# Patient Record
Sex: MALE | Race: BLACK OR AFRICAN AMERICAN | NOT HISPANIC OR LATINO
[De-identification: names, ages, dates, MRNs, and addresses within clinical notes are randomized per-mention and may not be internally consistent; named-entity substitution may affect disease eponyms.]

---

## 2019-12-23 ENCOUNTER — APPOINTMENT (OUTPATIENT)
Dept: CARDIOLOGY | Facility: CLINIC | Age: 54
End: 2019-12-23

## 2019-12-30 ENCOUNTER — APPOINTMENT (OUTPATIENT)
Dept: CARDIOLOGY | Facility: CLINIC | Age: 54
End: 2019-12-30
Payer: COMMERCIAL

## 2019-12-30 ENCOUNTER — LABORATORY RESULT (OUTPATIENT)
Age: 54
End: 2019-12-30

## 2019-12-30 ENCOUNTER — NON-APPOINTMENT (OUTPATIENT)
Age: 54
End: 2019-12-30

## 2019-12-30 VITALS
BODY MASS INDEX: 27.2 KG/M2 | HEART RATE: 54 BPM | DIASTOLIC BLOOD PRESSURE: 90 MMHG | WEIGHT: 190 LBS | HEIGHT: 70 IN | OXYGEN SATURATION: 99 % | SYSTOLIC BLOOD PRESSURE: 150 MMHG | TEMPERATURE: 98.2 F

## 2019-12-30 VITALS — SYSTOLIC BLOOD PRESSURE: 146 MMHG | DIASTOLIC BLOOD PRESSURE: 82 MMHG

## 2019-12-30 DIAGNOSIS — Z71.89 OTHER SPECIFIED COUNSELING: ICD-10-CM

## 2019-12-30 DIAGNOSIS — Z00.00 ENCOUNTER FOR GENERAL ADULT MEDICAL EXAMINATION W/OUT ABNORMAL FINDINGS: ICD-10-CM

## 2019-12-30 DIAGNOSIS — Z12.5 ENCOUNTER FOR SCREENING FOR MALIGNANT NEOPLASM OF PROSTATE: ICD-10-CM

## 2019-12-30 DIAGNOSIS — Z78.9 OTHER SPECIFIED HEALTH STATUS: ICD-10-CM

## 2019-12-30 PROCEDURE — 99386 PREV VISIT NEW AGE 40-64: CPT

## 2019-12-30 PROCEDURE — 93000 ELECTROCARDIOGRAM COMPLETE: CPT

## 2019-12-30 NOTE — PHYSICAL EXAM
[No Acute Distress] : no acute distress [Well Developed] : well developed [Well Nourished] : well nourished [Well-Appearing] : well-appearing [Normal Sclera/Conjunctiva] : normal sclera/conjunctiva [EOMI] : extraocular movements intact [PERRL] : pupils equal round and reactive to light [Normal Outer Ear/Nose] : the outer ears and nose were normal in appearance [No JVD] : no jugular venous distention [Normal Oropharynx] : the oropharynx was normal [No Lymphadenopathy] : no lymphadenopathy [Supple] : supple [Thyroid Normal, No Nodules] : the thyroid was normal and there were no nodules present [No Accessory Muscle Use] : no accessory muscle use [No Respiratory Distress] : no respiratory distress  [Clear to Auscultation] : lungs were clear to auscultation bilaterally [Normal Rate] : normal rate  [Regular Rhythm] : with a regular rhythm [Normal S1, S2] : normal S1 and S2 [No Murmur] : no murmur heard [No Carotid Bruits] : no carotid bruits [No Abdominal Bruit] : a ~M bruit was not heard ~T in the abdomen [No Varicosities] : no varicosities [Pedal Pulses Present] : the pedal pulses are present [No Edema] : there was no peripheral edema [No Palpable Aorta] : no palpable aorta [No Extremity Clubbing/Cyanosis] : no extremity clubbing/cyanosis [Non Tender] : non-tender [Soft] : abdomen soft [No Masses] : no abdominal mass palpated [Non-distended] : non-distended [No HSM] : no HSM [Normal Bowel Sounds] : normal bowel sounds [Normal Anterior Cervical Nodes] : no anterior cervical lymphadenopathy [Normal Posterior Cervical Nodes] : no posterior cervical lymphadenopathy [No CVA Tenderness] : no CVA  tenderness [No Joint Swelling] : no joint swelling [No Spinal Tenderness] : no spinal tenderness [Grossly Normal Strength/Tone] : grossly normal strength/tone [No Rash] : no rash [Coordination Grossly Intact] : coordination grossly intact [No Focal Deficits] : no focal deficits [Normal Gait] : normal gait [Deep Tendon Reflexes (DTR)] : deep tendon reflexes were 2+ and symmetric [Normal Affect] : the affect was normal [Normal Insight/Judgement] : insight and judgment were intact [FreeTextEntry1] : prostatte enlarged  [de-identified] : Prostate enlarged /normal genitals

## 2019-12-30 NOTE — HISTORY OF PRESENT ILLNESS
[FreeTextEntry1] : Annual Wellness Exam and to Establish care  [de-identified] : This is a 54 year old gentlemen with no significant PMH presents today for his annual wellness exam and to establish care at this time. Patient states that he his feeling good and has no complaints at this time. Patient denies dyspnea, palpitations, chest pain, nausea, vomiting, dizziness and lightheadedness.\par

## 2020-01-09 ENCOUNTER — MOBILE ON CALL (OUTPATIENT)
Age: 55
End: 2020-01-09

## 2020-01-12 LAB
25(OH)D3 SERPL-MCNC: 28 NG/ML
ALBUMIN SERPL ELPH-MCNC: 4.5 G/DL
ALP BLD-CCNC: 78 U/L
ALT SERPL-CCNC: 57 U/L
ANION GAP SERPL CALC-SCNC: 13 MMOL/L
APPEARANCE: CLEAR
AST SERPL-CCNC: 31 U/L
BACTERIA: NEGATIVE
BASOPHILS # BLD AUTO: 0.03 K/UL
BASOPHILS NFR BLD AUTO: 0.4 %
BILIRUB SERPL-MCNC: 0.2 MG/DL
BILIRUBIN URINE: NEGATIVE
BLOOD URINE: NEGATIVE
BUN SERPL-MCNC: 13 MG/DL
CALCIUM SERPL-MCNC: 10.1 MG/DL
CHLORIDE SERPL-SCNC: 101 MMOL/L
CHOLEST SERPL-MCNC: 188 MG/DL
CHOLEST/HDLC SERPL: 3.4 RATIO
CO2 SERPL-SCNC: 26 MMOL/L
COLOR: YELLOW
CREAT SERPL-MCNC: 1.2 MG/DL
EOSINOPHIL # BLD AUTO: 0.39 K/UL
EOSINOPHIL NFR BLD AUTO: 4.8 %
ESTIMATED AVERAGE GLUCOSE: 131 MG/DL
GLUCOSE QUALITATIVE U: NEGATIVE
GLUCOSE SERPL-MCNC: 93 MG/DL
HBA1C MFR BLD HPLC: 6.2 %
HCT VFR BLD CALC: 44.3 %
HDLC SERPL-MCNC: 56 MG/DL
HGB BLD-MCNC: 13.6 G/DL
HYALINE CASTS: 0 /LPF
IMM GRANULOCYTES NFR BLD AUTO: 0.4 %
KETONES URINE: NEGATIVE
LDLC SERPL CALC-MCNC: 100 MG/DL
LEUKOCYTE ESTERASE URINE: NEGATIVE
LYMPHOCYTES # BLD AUTO: 2.93 K/UL
LYMPHOCYTES NFR BLD AUTO: 35.9 %
MAGNESIUM SERPL-MCNC: 2.1 MG/DL
MAN DIFF?: NORMAL
MCHC RBC-ENTMCNC: 28.3 PG
MCHC RBC-ENTMCNC: 30.7 GM/DL
MCV RBC AUTO: 92.3 FL
MICROSCOPIC-UA: NORMAL
MONOCYTES # BLD AUTO: 0.54 K/UL
MONOCYTES NFR BLD AUTO: 6.6 %
NEUTROPHILS # BLD AUTO: 4.24 K/UL
NEUTROPHILS NFR BLD AUTO: 51.9 %
NITRITE URINE: NEGATIVE
PH URINE: 7.5
PHOSPHATE SERPL-MCNC: 3.9 MG/DL
PLATELET # BLD AUTO: 230 K/UL
POTASSIUM SERPL-SCNC: 4.8 MMOL/L
PROT SERPL-MCNC: 7 G/DL
PROTEIN URINE: NEGATIVE
PSA SERPL-MCNC: 5.11 NG/ML
RBC # BLD: 4.8 M/UL
RBC # FLD: 14.8 %
RED BLOOD CELLS URINE: 2 /HPF
SODIUM SERPL-SCNC: 140 MMOL/L
SPECIFIC GRAVITY URINE: 1.02
SQUAMOUS EPITHELIAL CELLS: 0 /HPF
T3RU NFR SERPL: 1 TBI
T4 FREE SERPL-MCNC: 1 NG/DL
T4 SERPL-MCNC: 6 UG/DL
TESTOST BND SERPL-MCNC: 10.7 PG/ML
TESTOST SERPL-MCNC: 426.7 NG/DL
TRIGL SERPL-MCNC: 162 MG/DL
TSH SERPL-ACNC: 1.84 UIU/ML
URATE SERPL-MCNC: 6.1 MG/DL
UROBILINOGEN URINE: NORMAL
WBC # FLD AUTO: 8.16 K/UL
WHITE BLOOD CELLS URINE: 0 /HPF

## 2020-01-13 ENCOUNTER — TRANSCRIPTION ENCOUNTER (OUTPATIENT)
Age: 55
End: 2020-01-13

## 2020-01-22 ENCOUNTER — APPOINTMENT (OUTPATIENT)
Dept: UROLOGY | Facility: CLINIC | Age: 55
End: 2020-01-22
Payer: COMMERCIAL

## 2020-01-22 VITALS
HEART RATE: 66 BPM | SYSTOLIC BLOOD PRESSURE: 135 MMHG | WEIGHT: 185 LBS | TEMPERATURE: 98.4 F | RESPIRATION RATE: 13 BRPM | OXYGEN SATURATION: 98 % | HEIGHT: 70 IN | DIASTOLIC BLOOD PRESSURE: 83 MMHG | BODY MASS INDEX: 26.48 KG/M2

## 2020-01-22 PROCEDURE — 99203 OFFICE O/P NEW LOW 30 MIN: CPT

## 2020-01-22 RX ORDER — BLOOD SUGAR DIAGNOSTIC
100 STRIP MISCELLANEOUS
Refills: 0 | Status: ACTIVE | COMMUNITY

## 2020-01-22 NOTE — PHYSICAL EXAM
[Normal Appearance] : normal appearance [General Appearance - Well Nourished] : well nourished [General Appearance - Well Developed] : well developed [General Appearance - In No Acute Distress] : no acute distress [Well Groomed] : well groomed [Edema] : no peripheral edema [Respiration, Rhythm And Depth] : normal respiratory rhythm and effort [Abdomen Soft] : soft [Exaggerated Use Of Accessory Muscles For Inspiration] : no accessory muscle use [Costovertebral Angle Tenderness] : no ~M costovertebral angle tenderness [Abdomen Tenderness] : non-tender [Urethral Meatus] : meatus normal [Penis Abnormality] : normal circumcised penis [Urinary Bladder Findings] : the bladder was normal on palpation [Testes Mass (___cm)] : there were no testicular masses [Prostate Tenderness] : the prostate was not tender [Testes Tenderness] : no tenderness of the testes [Scrotum] : the scrotum was normal [Prostate Enlarged] : was enlarged [No Prostate Nodules] : no prostate nodules [No Focal Deficits] : no focal deficits [Normal Station and Gait] : the gait and station were normal for the patient's age [] : no rash [Affect] : the affect was normal [Oriented To Time, Place, And Person] : oriented to person, place, and time [Mood] : the mood was normal [Inguinal Lymph Nodes Enlarged Bilaterally] : inguinal [Not Anxious] : not anxious

## 2020-01-22 NOTE — ASSESSMENT
[FreeTextEntry1] : He does not have significant urinary symptoms. He will stop taking over-the-counter supplements on testosterone booster. We had a long discussion regarding his PSA level. We discussed different PSA parameters such as PSA velocity, free PSA and age-adjusted PSA level. Other markers such as 4K score, PCA3, Prostate health Index, etc were reviewed.  We also discussed observation with repeat PSA level, prostate or pelvic MRI and prostate biopsy in detail. Risks and benefits of each option were discussed and questions were answered. Patient was made aware that prostate cancer can exist at any PSA level.\par Prostate biopsy was reviewed in detail. We discussed how the procedure is performed. Preparation with oral antibiotics and Fleet enema was reviewed. Risks of biopsy especially bacteremia, UTI, sepsis, bleeding, erectile dysfunction, etc were discussed. He was made aware that biopsy may not be able to diagnose prostate cancer. Questions were answered. At first, he will repeat PSA level in a few weeks and then we will decide on the next step.\par \par Mundo Shah MD, FACS\par [facility]\par  of Urology\par \par [address], [address]\[place] [address]\par \par [address], [address]\[place] [address]\par \par Tel: [phone]\par Fax: [phone]

## 2020-01-22 NOTE — HISTORY OF PRESENT ILLNESS
[FreeTextEntry1] : He is a 54-year-old man who is seen today for initial visit. He was told a few years ago to have elevated PSA level and after he stopped over-the-counter testosterone supplementation, PSA level decreased. He does not recall his levels. He does not have any urinary symptoms. There is no family history of prostate cancer or other neurological issues. PSA level was 5.1 in December 2019, hemoglobin A1c 6.2, urinalysis normal, testosterone 427. There is no weight loss or bone pain. He is on multiple supplementations including testosterone booster and saw palmetto.

## 2020-02-05 ENCOUNTER — APPOINTMENT (OUTPATIENT)
Dept: CARDIOLOGY | Facility: CLINIC | Age: 55
End: 2020-02-05

## 2020-02-06 ENCOUNTER — APPOINTMENT (OUTPATIENT)
Dept: CARDIOLOGY | Facility: CLINIC | Age: 55
End: 2020-02-06
Payer: COMMERCIAL

## 2020-02-06 VITALS
OXYGEN SATURATION: 98 % | HEIGHT: 70 IN | WEIGHT: 185.5 LBS | SYSTOLIC BLOOD PRESSURE: 120 MMHG | TEMPERATURE: 98.4 F | HEART RATE: 70 BPM | BODY MASS INDEX: 26.56 KG/M2 | DIASTOLIC BLOOD PRESSURE: 80 MMHG

## 2020-02-06 PROCEDURE — 99213 OFFICE O/P EST LOW 20 MIN: CPT

## 2020-02-06 RX ORDER — CHROMIUM 200 MCG
25 MCG TABLET ORAL
Refills: 0 | Status: ACTIVE | COMMUNITY
Start: 2020-02-06

## 2020-02-06 NOTE — HISTORY OF PRESENT ILLNESS
[FreeTextEntry1] : Akhil is a 53yo male who presents for routine follow-up. He  was last seen in office in December and was found to have elevated BP. He reports he did not make any lifestyle changes since his previous visit. patient was also advised to go to urology for an enlarged prostate which he did. Pt saw Dr. Shah who recommended that pt return to office in a few weeks to repeat PSA and possible biopsy if still elevated.

## 2020-02-06 NOTE — PHYSICAL EXAM
[General Appearance - Well Developed] : well developed [Well Groomed] : well groomed [Normal Appearance] : normal appearance [No Deformities] : no deformities [General Appearance - In No Acute Distress] : no acute distress [General Appearance - Well Nourished] : well nourished [Normal Conjunctiva] : the conjunctiva exhibited no abnormalities [Eyelids - No Xanthelasma] : the eyelids demonstrated no xanthelasmas [No Oral Cyanosis] : no oral cyanosis [No Oral Pallor] : no oral pallor [Normal Oral Mucosa] : normal oral mucosa [Normal Jugular Venous V Waves Present] : normal jugular venous V waves present [Normal Jugular Venous A Waves Present] : normal jugular venous A waves present [No Jugular Venous Hansen A Waves] : no jugular venous hansen A waves [Respiration, Rhythm And Depth] : normal respiratory rhythm and effort [Heart Rate And Rhythm] : heart rate and rhythm were normal [Auscultation Breath Sounds / Voice Sounds] : lungs were clear to auscultation bilaterally [Exaggerated Use Of Accessory Muscles For Inspiration] : no accessory muscle use [Abdomen Soft] : soft [Heart Sounds] : normal S1 and S2 [Murmurs] : no murmurs present [Abdomen Tenderness] : non-tender [Abdomen Mass (___ Cm)] : no abdominal mass palpated [Gait - Sufficient For Exercise Testing] : the gait was sufficient for exercise testing [Nail Clubbing] : no clubbing of the fingernails [Abnormal Walk] : normal gait [Petechial Hemorrhages (___cm)] : no petechial hemorrhages [Cyanosis, Localized] : no localized cyanosis [Skin Color & Pigmentation] : normal skin color and pigmentation [] : no rash [Skin Lesions] : no skin lesions [No Venous Stasis] : no venous stasis [No Skin Ulcers] : no skin ulcer [No Xanthoma] : no  xanthoma was observed [Oriented To Time, Place, And Person] : oriented to person, place, and time [No Anxiety] : not feeling anxious [Affect] : the affect was normal [Mood] : the mood was normal

## 2020-05-16 ENCOUNTER — TRANSCRIPTION ENCOUNTER (OUTPATIENT)
Age: 55
End: 2020-05-16

## 2020-05-27 ENCOUNTER — APPOINTMENT (OUTPATIENT)
Dept: UROLOGY | Facility: CLINIC | Age: 55
End: 2020-05-27
Payer: COMMERCIAL

## 2020-05-27 VITALS
BODY MASS INDEX: 26.48 KG/M2 | OXYGEN SATURATION: 98 % | RESPIRATION RATE: 13 BRPM | TEMPERATURE: 98.5 F | WEIGHT: 185 LBS | HEIGHT: 70 IN | DIASTOLIC BLOOD PRESSURE: 88 MMHG | SYSTOLIC BLOOD PRESSURE: 134 MMHG | HEART RATE: 64 BPM

## 2020-05-27 DIAGNOSIS — R73.03 PREDIABETES.: ICD-10-CM

## 2020-05-27 LAB — PSA SERPL-MCNC: 7.58 NG/ML

## 2020-05-27 PROCEDURE — 99213 OFFICE O/P EST LOW 20 MIN: CPT

## 2020-05-27 NOTE — PHYSICAL EXAM
[General Appearance - Well Developed] : well developed [General Appearance - Well Nourished] : well nourished [Normal Appearance] : normal appearance [Well Groomed] : well groomed [General Appearance - In No Acute Distress] : no acute distress [Abdomen Tenderness] : non-tender [Abdomen Soft] : soft [Costovertebral Angle Tenderness] : no ~M costovertebral angle tenderness [Urethral Meatus] : meatus normal [Penis Abnormality] : normal circumcised penis [Urinary Bladder Findings] : the bladder was normal on palpation [Scrotum] : the scrotum was normal [Testes Tenderness] : no tenderness of the testes [Testes Mass (___cm)] : there were no testicular masses [Prostate Tenderness] : the prostate was not tender [No Prostate Nodules] : no prostate nodules [Prostate Enlarged] : was enlarged [FreeTextEntry1] : JALIL done Jan 2020, Vasa palpable.  [] : no respiratory distress [Respiration, Rhythm And Depth] : normal respiratory rhythm and effort [Exaggerated Use Of Accessory Muscles For Inspiration] : no accessory muscle use [Oriented To Time, Place, And Person] : oriented to person, place, and time [Affect] : the affect was normal [Not Anxious] : not anxious [Mood] : the mood was normal

## 2020-05-27 NOTE — ASSESSMENT
[FreeTextEntry1] : We discussed having a prostate biopsy again and its risks in detail. Preparation for biopsy was discussed. Pending insurance approval, we can attempt to have a prostate MRI done first to assess for any prostate lesions. Depending on the MRI results, we will determine what type of biopsy he should have.\par He is interested in proceeding with vasectomy. Different surgical techniques and their outcomes were discussed. Risks of the procedure discussed included but were not limited to bleeding, infection, testicular injury or atrophy, failure of the procedure and need for redo procedure, etc. Post-vasectomy pain syndrome was discussed. Also, studies discussing the link between vasectomy and prostate cancer were discussed. Use of local vs. general anesthesia was discussed. He may consider vasectomy and biopsy simultaneously or at separate times.\par \par Mundo Shah MD, FACS\par [facility]\par  of Urology\par \par [address], [address]\[place] [address]\par \par [address], [address]\[place] [address]\par \par Tel: [phone]\par Fax: [phone]

## 2020-05-27 NOTE — HISTORY OF PRESENT ILLNESS
[FreeTextEntry1] : He is a 54-year-old man who is seen today for elevated PSA level. He has no new symptoms. He waited several months and repeated PSA level after stopping any over-the-counter supplements. His level actually increased to 7.58 in May 2020. He has 3 children. PSA level was 5.1 in December 2019, hemoglobin A1c 6.2, urinalysis normal, testosterone 427. He is also interested in a vasectomy.\par Previous notes: He was told a few years ago to have elevated PSA level and after he stopped over-the-counter testosterone supplementation, PSA level decreased. He does not have any urinary symptoms. There is no family history of prostate cancer or other urological issues. There is no weight loss or bone pain. He is on multiple supplementations including testosterone booster and saw palmetto.

## 2020-06-08 ENCOUNTER — APPOINTMENT (OUTPATIENT)
Dept: CARDIOLOGY | Facility: CLINIC | Age: 55
End: 2020-06-08

## 2020-06-10 ENCOUNTER — OUTPATIENT (OUTPATIENT)
Dept: OUTPATIENT SERVICES | Facility: HOSPITAL | Age: 55
LOS: 1 days | End: 2020-06-10
Payer: COMMERCIAL

## 2020-06-10 ENCOUNTER — APPOINTMENT (OUTPATIENT)
Dept: MRI IMAGING | Facility: CLINIC | Age: 55
End: 2020-06-10
Payer: COMMERCIAL

## 2020-06-10 ENCOUNTER — RESULT REVIEW (OUTPATIENT)
Age: 55
End: 2020-06-10

## 2020-06-10 DIAGNOSIS — Z00.8 ENCOUNTER FOR OTHER GENERAL EXAMINATION: ICD-10-CM

## 2020-06-10 DIAGNOSIS — R97.20 ELEVATED PROSTATE SPECIFIC ANTIGEN [PSA]: ICD-10-CM

## 2020-06-10 PROCEDURE — 76377 3D RENDER W/INTRP POSTPROCES: CPT | Mod: 26

## 2020-06-10 PROCEDURE — 76377 3D RENDER W/INTRP POSTPROCES: CPT

## 2020-06-10 PROCEDURE — A9585: CPT

## 2020-06-10 PROCEDURE — 72197 MRI PELVIS W/O & W/DYE: CPT | Mod: 26

## 2020-06-10 PROCEDURE — 72197 MRI PELVIS W/O & W/DYE: CPT

## 2020-07-15 ENCOUNTER — LABORATORY RESULT (OUTPATIENT)
Age: 55
End: 2020-07-15

## 2020-07-16 ENCOUNTER — APPOINTMENT (OUTPATIENT)
Dept: UROLOGY | Facility: CLINIC | Age: 55
End: 2020-07-16
Payer: COMMERCIAL

## 2020-07-16 VITALS — TEMPERATURE: 98.2 F

## 2020-07-16 PROCEDURE — 55250 REMOVAL OF SPERM DUCT(S): CPT

## 2020-07-21 ENCOUNTER — APPOINTMENT (OUTPATIENT)
Dept: CARDIOLOGY | Facility: CLINIC | Age: 55
End: 2020-07-21
Payer: COMMERCIAL

## 2020-07-21 ENCOUNTER — NON-APPOINTMENT (OUTPATIENT)
Age: 55
End: 2020-07-21

## 2020-07-21 VITALS
BODY MASS INDEX: 25.05 KG/M2 | HEIGHT: 70 IN | OXYGEN SATURATION: 99 % | TEMPERATURE: 97.9 F | HEART RATE: 56 BPM | SYSTOLIC BLOOD PRESSURE: 110 MMHG | DIASTOLIC BLOOD PRESSURE: 68 MMHG | WEIGHT: 175 LBS

## 2020-07-21 DIAGNOSIS — R53.83 OTHER FATIGUE: ICD-10-CM

## 2020-07-21 PROCEDURE — 99213 OFFICE O/P EST LOW 20 MIN: CPT

## 2020-07-21 PROCEDURE — 93000 ELECTROCARDIOGRAM COMPLETE: CPT

## 2020-07-21 NOTE — HISTORY OF PRESENT ILLNESS
[FreeTextEntry1] : pt presents for f/u pt with increased psa s/p recent mri prostate and adressing with urology being managed conservatively .ptt with borderline hgba1c aned low vitamind ,.pt feels well pt with rare fatigue pt denies any chest  pain dizziness ,lightheadedness ,nausea vomiting diaphoresis\par

## 2020-07-21 NOTE — PHYSICAL EXAM
[General Appearance - Well Developed] : well developed [Well Groomed] : well groomed [Normal Appearance] : normal appearance [No Deformities] : no deformities [General Appearance - Well Nourished] : well nourished [General Appearance - In No Acute Distress] : no acute distress [Eyelids - No Xanthelasma] : the eyelids demonstrated no xanthelasmas [Normal Conjunctiva] : the conjunctiva exhibited no abnormalities [Normal Oral Mucosa] : normal oral mucosa [Normal Jugular Venous A Waves Present] : normal jugular venous A waves present [No Oral Cyanosis] : no oral cyanosis [No Oral Pallor] : no oral pallor [No Jugular Venous Hansen A Waves] : no jugular venous hansen A waves [Normal Jugular Venous V Waves Present] : normal jugular venous V waves present [Auscultation Breath Sounds / Voice Sounds] : lungs were clear to auscultation bilaterally [Heart Rate And Rhythm] : heart rate and rhythm were normal [Respiration, Rhythm And Depth] : normal respiratory rhythm and effort [Exaggerated Use Of Accessory Muscles For Inspiration] : no accessory muscle use [Heart Sounds] : normal S1 and S2 [Abdomen Soft] : soft [Murmurs] : no murmurs present [Abdomen Tenderness] : non-tender [Abdomen Mass (___ Cm)] : no abdominal mass palpated [Abnormal Walk] : normal gait [Gait - Sufficient For Exercise Testing] : the gait was sufficient for exercise testing [Nail Clubbing] : no clubbing of the fingernails [Cyanosis, Localized] : no localized cyanosis [Petechial Hemorrhages (___cm)] : no petechial hemorrhages [Skin Color & Pigmentation] : normal skin color and pigmentation [] : no rash [Skin Lesions] : no skin lesions [No Venous Stasis] : no venous stasis [No Xanthoma] : no  xanthoma was observed [No Skin Ulcers] : no skin ulcer [Oriented To Time, Place, And Person] : oriented to person, place, and time [Affect] : the affect was normal [Mood] : the mood was normal [No Anxiety] : not feeling anxious

## 2020-08-15 LAB
25(OH)D3 SERPL-MCNC: 37.6 NG/ML
ALBUMIN SERPL ELPH-MCNC: 4.7 G/DL
ALP BLD-CCNC: 79 U/L
ALT SERPL-CCNC: 39 U/L
ANION GAP SERPL CALC-SCNC: 13 MMOL/L
APPEARANCE: CLEAR
AST SERPL-CCNC: 33 U/L
BACTERIA: NEGATIVE
BASOPHILS # BLD AUTO: 0.02 K/UL
BASOPHILS NFR BLD AUTO: 0.2 %
BILIRUB SERPL-MCNC: 0.3 MG/DL
BILIRUBIN URINE: NEGATIVE
BLOOD URINE: NEGATIVE
BUN SERPL-MCNC: 16 MG/DL
CALCIUM SERPL-MCNC: 10.1 MG/DL
CHLORIDE SERPL-SCNC: 100 MMOL/L
CHOLEST SERPL-MCNC: 169 MG/DL
CHOLEST/HDLC SERPL: 2.7 RATIO
CO2 SERPL-SCNC: 27 MMOL/L
COLOR: YELLOW
CREAT SERPL-MCNC: 1.16 MG/DL
EOSINOPHIL # BLD AUTO: 0.27 K/UL
EOSINOPHIL NFR BLD AUTO: 3.3 %
ESTIMATED AVERAGE GLUCOSE: 128 MG/DL
GLUCOSE QUALITATIVE U: NEGATIVE
GLUCOSE SERPL-MCNC: 91 MG/DL
HBA1C MFR BLD HPLC: 6.1 %
HCT VFR BLD CALC: 43.8 %
HDLC SERPL-MCNC: 63 MG/DL
HGB BLD-MCNC: 13.5 G/DL
HYALINE CASTS: 0 /LPF
IMM GRANULOCYTES NFR BLD AUTO: 0.4 %
KETONES URINE: NEGATIVE
LDLC SERPL CALC-MCNC: 83 MG/DL
LDLC SERPL DIRECT ASSAY-MCNC: 97 MG/DL
LEUKOCYTE ESTERASE URINE: NEGATIVE
LYMPHOCYTES # BLD AUTO: 2.51 K/UL
LYMPHOCYTES NFR BLD AUTO: 30.3 %
MAN DIFF?: NORMAL
MCHC RBC-ENTMCNC: 28.2 PG
MCHC RBC-ENTMCNC: 30.8 GM/DL
MCV RBC AUTO: 91.4 FL
MICROSCOPIC-UA: NORMAL
MONOCYTES # BLD AUTO: 0.58 K/UL
MONOCYTES NFR BLD AUTO: 7 %
NEUTROPHILS # BLD AUTO: 4.87 K/UL
NEUTROPHILS NFR BLD AUTO: 58.8 %
NITRITE URINE: NEGATIVE
PH URINE: 5.5
PLATELET # BLD AUTO: 199 K/UL
POTASSIUM SERPL-SCNC: 4.3 MMOL/L
PROT SERPL-MCNC: 7.2 G/DL
PROTEIN URINE: NORMAL
RBC # BLD: 4.79 M/UL
RBC # FLD: 13.7 %
RED BLOOD CELLS URINE: 2 /HPF
SODIUM SERPL-SCNC: 140 MMOL/L
SPECIFIC GRAVITY URINE: 1.03
SQUAMOUS EPITHELIAL CELLS: 0 /HPF
TRIGL SERPL-MCNC: 115 MG/DL
TSH SERPL-ACNC: 1.33 UIU/ML
UROBILINOGEN URINE: NORMAL
WBC # FLD AUTO: 8.28 K/UL
WHITE BLOOD CELLS URINE: 1 /HPF

## 2020-10-15 ENCOUNTER — APPOINTMENT (OUTPATIENT)
Dept: UROLOGY | Facility: CLINIC | Age: 55
End: 2020-10-15
Payer: COMMERCIAL

## 2020-10-15 VITALS
TEMPERATURE: 98.4 F | WEIGHT: 180 LBS | DIASTOLIC BLOOD PRESSURE: 79 MMHG | RESPIRATION RATE: 14 BRPM | OXYGEN SATURATION: 98 % | BODY MASS INDEX: 25.77 KG/M2 | SYSTOLIC BLOOD PRESSURE: 124 MMHG | HEART RATE: 64 BPM | HEIGHT: 70 IN

## 2020-10-15 DIAGNOSIS — Z30.09 ENCOUNTER FOR OTHER GENERAL COUNSELING AND ADVICE ON CONTRACEPTION: ICD-10-CM

## 2020-10-15 PROCEDURE — 99213 OFFICE O/P EST LOW 20 MIN: CPT

## 2020-10-15 NOTE — ASSESSMENT
[FreeTextEntry1] : Despite enlarged prostate on MRI, he does not have significant urinary symptoms. PSA level will be repeated. False negative rate of MRI for detection of prostate cancer was discussed. If PSA level remains elevated, he should consider biopsy. Also he will undergo semen analysis and call me for the results. He has recovered well from vasectomy.\par \par Mundo Shah MD, FACS\par [facility]\par  of Urology\par \par [address], [address]\[place] [address]\par \par [address], [address]\[place] [address]\par \par Tel: [phone]\par Fax: [phone]

## 2020-10-15 NOTE — HISTORY OF PRESENT ILLNESS
[FreeTextEntry1] : He is a 55-year-old man who is seen today for elevated PSA level. He underwent vasectomy in July 2020. He has recovered well from the procedure. He underwent MRI of the prostate for elevated PSA level in June 2020 which showed prostate volume 85 cc with a 3 mm right peripheral zone region with suspicion score of 2. He has never had a prostate biopsy.\par He waited several months and repeated PSA level after stopping any over-the-counter supplements. PSA increased to 7.58 in May 2020. PSA level was 5.1 in December 2019, hemoglobin A1c 6.2, urinalysis normal, testosterone 427. \par \par Previous notes: He was told a few years ago to have elevated PSA level and after he stopped over-the-counter testosterone supplementation, PSA level decreased. He does not have any urinary symptoms. There is no family history of prostate cancer or other urological issues. There is no weight loss or bone pain. He is on multiple supplementations including testosterone booster and saw palmetto.

## 2020-10-15 NOTE — PHYSICAL EXAM
[General Appearance - Well Developed] : well developed [General Appearance - Well Nourished] : well nourished [Normal Appearance] : normal appearance [Well Groomed] : well groomed [General Appearance - In No Acute Distress] : no acute distress [Abdomen Soft] : soft [Abdomen Tenderness] : non-tender [Costovertebral Angle Tenderness] : no ~M costovertebral angle tenderness [Urethral Meatus] : meatus normal [Penis Abnormality] : normal circumcised penis [Scrotum] : the scrotum was normal [Urinary Bladder Findings] : the bladder was normal on palpation [Testes Tenderness] : no tenderness of the testes [Testes Mass (___cm)] : there were no testicular masses [Prostate Tenderness] : the prostate was not tender [No Prostate Nodules] : no prostate nodules [Prostate Enlarged] : was enlarged [FreeTextEntry1] : JALIL done Jan 2020. [] : no respiratory distress [Respiration, Rhythm And Depth] : normal respiratory rhythm and effort [Exaggerated Use Of Accessory Muscles For Inspiration] : no accessory muscle use [Mood] : the mood was normal [Affect] : the affect was normal [Oriented To Time, Place, And Person] : oriented to person, place, and time [Not Anxious] : not anxious

## 2020-10-16 LAB
PSA FREE FLD-MCNC: 22 %
PSA FREE SERPL-MCNC: 1.92 NG/ML
PSA SERPL-MCNC: 8.84 NG/ML

## 2020-11-04 ENCOUNTER — APPOINTMENT (OUTPATIENT)
Dept: UROLOGY | Facility: CLINIC | Age: 55
End: 2020-11-04

## 2020-11-06 ENCOUNTER — APPOINTMENT (OUTPATIENT)
Dept: UROLOGY | Facility: CLINIC | Age: 55
End: 2020-11-06
Payer: COMMERCIAL

## 2020-11-06 VITALS
OXYGEN SATURATION: 98 % | HEIGHT: 70 IN | RESPIRATION RATE: 14 BRPM | HEART RATE: 74 BPM | TEMPERATURE: 98.3 F | DIASTOLIC BLOOD PRESSURE: 86 MMHG | BODY MASS INDEX: 25.77 KG/M2 | SYSTOLIC BLOOD PRESSURE: 142 MMHG | WEIGHT: 180 LBS

## 2020-11-06 PROCEDURE — 55700: CPT

## 2020-11-06 PROCEDURE — 99072 ADDL SUPL MATRL&STAF TM PHE: CPT

## 2020-11-06 PROCEDURE — 76942 ECHO GUIDE FOR BIOPSY: CPT

## 2020-11-06 RX ORDER — ENEMA 19; 7 G/133ML; G/133ML
7-19 ENEMA RECTAL
Qty: 2 | Refills: 0 | Status: DISCONTINUED | COMMUNITY
Start: 2020-10-16 | End: 2020-11-06

## 2020-11-06 RX ORDER — CIPROFLOXACIN HYDROCHLORIDE 500 MG/1
500 TABLET, FILM COATED ORAL
Qty: 2 | Refills: 0 | Status: DISCONTINUED | COMMUNITY
Start: 2020-10-16 | End: 2020-11-06

## 2020-11-09 ENCOUNTER — NON-APPOINTMENT (OUTPATIENT)
Age: 55
End: 2020-11-09

## 2020-11-12 ENCOUNTER — NON-APPOINTMENT (OUTPATIENT)
Age: 55
End: 2020-11-12

## 2020-12-18 ENCOUNTER — NON-APPOINTMENT (OUTPATIENT)
Age: 55
End: 2020-12-18

## 2021-02-03 ENCOUNTER — APPOINTMENT (OUTPATIENT)
Dept: CARDIOLOGY | Facility: CLINIC | Age: 56
End: 2021-02-03

## 2021-03-17 ENCOUNTER — APPOINTMENT (OUTPATIENT)
Dept: UROLOGY | Facility: CLINIC | Age: 56
End: 2021-03-17
Payer: COMMERCIAL

## 2021-03-17 VITALS
SYSTOLIC BLOOD PRESSURE: 143 MMHG | OXYGEN SATURATION: 97 % | HEIGHT: 70 IN | HEART RATE: 80 BPM | RESPIRATION RATE: 16 BRPM | BODY MASS INDEX: 25.77 KG/M2 | WEIGHT: 180 LBS | TEMPERATURE: 98.3 F | DIASTOLIC BLOOD PRESSURE: 87 MMHG

## 2021-03-17 DIAGNOSIS — N40.0 BENIGN PROSTATIC HYPERPLASIA WITHOUT LOWER URINARY TRACT SYMPMS: ICD-10-CM

## 2021-03-17 PROCEDURE — 99072 ADDL SUPL MATRL&STAF TM PHE: CPT

## 2021-03-17 PROCEDURE — 99214 OFFICE O/P EST MOD 30 MIN: CPT

## 2021-03-17 NOTE — ASSESSMENT
[FreeTextEntry1] : PSA level will be repeated and we will discuss the results on the phone.  Despite enlarged prostate, he does not have significant urinary symptoms.  We discussed prostate cancer grading and staging system. Risk categories were discussed. Treatment options discussed included active surveillance, surgery, brachytherapy, external beam radiation therapy, Cyberknife, hormonal therapy, cryotherapy, chemotherapy, etc. Radical retropubic versus laparoscopic or robotic approaches were discussed. Postoperative care was reviewed. Risks of surgery discussed included but were not limited to bleeding, infection, injury to surrounding organs, DVT, PE, transfusion, etc. Risks of stress incontinence, bladder neck stricture, penile shortening and ED discussed. Possibility of positive margins, recurrence of prostate cancer and the need for additional treatment options such as radiation or hormonal therapy was reviewed. In case of active surveillance, follow-up schedule with PSA levels, repeat biopsy and possibly pelvic MRI was reviewed.\par Different types of radiation treatment as indicated above were discussed. Effects of radiation on the surrounding organs, urinary symptoms, ED, secondary malignancies, etc. reviewed as well. Depending on pathology report, further staging can be performed with CT and bone scans. Patient's questions were answered. If he decides to pursue treatment with our institution, he will followup shortly after deciding on which treatment option to proceed with. Obtaining a second opinion was highly recommended.  Pending PSA results, he can follow-up in 4 to 6 months.\par \par Mundo Shah MD, FACS\par [facility]\par  of Urology\par \par [address], [address]\[place] [address]\par \par [address], [address]\[place] [address]\par \par Tel: [phone]\par Fax: [phone]

## 2021-03-17 NOTE — HISTORY OF PRESENT ILLNESS
[FreeTextEntry1] : He is a 55-year-old man who is seen today for elevated PSA level and prostate cancer on observation.  Biopsy of the prostate in November 2020 showed less than 5% of 1 core [place] 6 prostate cancer.  In October 2020, PSA level was 8.8 with 22% free PSA.  He has decided on observation of prostate cancer for now.  He has undergone semen analysis after vasectomy which showed no sperm.  Nocturia is 1 time and urinary flow is normal.  He does not complain of erectile dysfunction.  \par \par He underwent vasectomy in July 2020. He underwent MRI of the prostate for elevated PSA level in June 2020 which showed prostate volume 85 cc with a 3 mm right peripheral zone region with suspicion score of 2. PSA increased to 7.58 in May 2020. PSA level was 5.1 in December 2019, hemoglobin A1c 6.2, urinalysis normal, testosterone 427. \par \par Previous notes: He was told a few years ago to have elevated PSA level and after he stopped over-the-counter testosterone supplementation, PSA level decreased. There is no family history of prostate cancer or other urological issues. There is no weight loss or bone pain. He is on multiple supplementations including testosterone booster and saw palmetto.

## 2021-03-17 NOTE — PHYSICAL EXAM
[General Appearance - Well Developed] : well developed [General Appearance - Well Nourished] : well nourished [Normal Appearance] : normal appearance [Well Groomed] : well groomed [General Appearance - In No Acute Distress] : no acute distress [Abdomen Soft] : soft [Abdomen Tenderness] : non-tender [Costovertebral Angle Tenderness] : no ~M costovertebral angle tenderness [Urethral Meatus] : meatus normal [Penis Abnormality] : normal circumcised penis [Urinary Bladder Findings] : the bladder was normal on palpation [Scrotum] : the scrotum was normal [Testes Tenderness] : no tenderness of the testes [Testes Mass (___cm)] : there were no testicular masses [Prostate Tenderness] : the prostate was not tender [No Prostate Nodules] : no prostate nodules [Prostate Enlarged] : was enlarged [FreeTextEntry1] : JALIL done in 2020.  [] : no respiratory distress [Respiration, Rhythm And Depth] : normal respiratory rhythm and effort [Exaggerated Use Of Accessory Muscles For Inspiration] : no accessory muscle use [Oriented To Time, Place, And Person] : oriented to person, place, and time [Affect] : the affect was normal [Mood] : the mood was normal [Not Anxious] : not anxious

## 2021-03-19 ENCOUNTER — NON-APPOINTMENT (OUTPATIENT)
Age: 56
End: 2021-03-19

## 2021-03-19 LAB — PSA SERPL-MCNC: 9.75 NG/ML

## 2021-03-29 ENCOUNTER — APPOINTMENT (OUTPATIENT)
Dept: RADIATION ONCOLOGY | Facility: CLINIC | Age: 56
End: 2021-03-29

## 2021-04-19 ENCOUNTER — APPOINTMENT (OUTPATIENT)
Dept: CARDIOLOGY | Facility: CLINIC | Age: 56
End: 2021-04-19

## 2021-04-22 ENCOUNTER — NON-APPOINTMENT (OUTPATIENT)
Age: 56
End: 2021-04-22

## 2021-04-22 ENCOUNTER — APPOINTMENT (OUTPATIENT)
Dept: CARDIOLOGY | Facility: CLINIC | Age: 56
End: 2021-04-22
Payer: COMMERCIAL

## 2021-04-22 VITALS
DIASTOLIC BLOOD PRESSURE: 70 MMHG | HEIGHT: 70 IN | OXYGEN SATURATION: 98 % | TEMPERATURE: 98.7 F | BODY MASS INDEX: 26.92 KG/M2 | SYSTOLIC BLOOD PRESSURE: 118 MMHG | HEART RATE: 80 BPM | WEIGHT: 188 LBS

## 2021-04-22 DIAGNOSIS — R03.0 ELEVATED BLOOD-PRESSURE READING, W/OUT DIAGNOSIS OF HYPERTENSION: ICD-10-CM

## 2021-04-22 DIAGNOSIS — Z13.228 ENCOUNTER FOR SCREENING FOR OTHER METABOLIC DISORDERS: ICD-10-CM

## 2021-04-22 DIAGNOSIS — Z00.00 ENCOUNTER FOR GENERAL ADULT MEDICAL EXAMINATION W/OUT ABNORMAL FINDINGS: ICD-10-CM

## 2021-04-22 DIAGNOSIS — R00.1 BRADYCARDIA, UNSPECIFIED: ICD-10-CM

## 2021-04-22 DIAGNOSIS — Z12.11 ENCOUNTER FOR SCREENING FOR MALIGNANT NEOPLASM OF COLON: ICD-10-CM

## 2021-04-22 DIAGNOSIS — E55.9 VITAMIN D DEFICIENCY, UNSPECIFIED: ICD-10-CM

## 2021-04-22 PROCEDURE — 99072 ADDL SUPL MATRL&STAF TM PHE: CPT

## 2021-04-22 PROCEDURE — 93000 ELECTROCARDIOGRAM COMPLETE: CPT

## 2021-04-22 PROCEDURE — 99396 PREV VISIT EST AGE 40-64: CPT

## 2021-04-22 NOTE — HISTORY OF PRESENT ILLNESS
[FreeTextEntry1] : Here for annual physical exam.  [de-identified] : Akhil is a 56yo male with PMH of prostate cancer who presents to the office today for his annual physical exam. Patient states he is feeling well today and has no issues or concerns. Patient denies chest pain, shortness of breath, palpitations, dizziness, vision changes, n/v, abdominal pain, changes in bowel/bladder habits,  or appetite. pt pending cyberknife surgery for prostate cancer in near future

## 2021-04-28 ENCOUNTER — NON-APPOINTMENT (OUTPATIENT)
Age: 56
End: 2021-04-28

## 2021-05-06 ENCOUNTER — APPOINTMENT (OUTPATIENT)
Dept: UROLOGY | Facility: CLINIC | Age: 56
End: 2021-05-06

## 2021-08-12 LAB
25(OH)D3 SERPL-MCNC: 70 NG/ML
ALBUMIN SERPL ELPH-MCNC: 4.5 G/DL
ALP BLD-CCNC: 76 U/L
ALT SERPL-CCNC: 41 U/L
ANION GAP SERPL CALC-SCNC: 8 MMOL/L
APPEARANCE: CLEAR
AST SERPL-CCNC: 30 U/L
BACTERIA: NEGATIVE
BASOPHILS # BLD AUTO: 0.02 K/UL
BASOPHILS NFR BLD AUTO: 0.2 %
BILIRUB SERPL-MCNC: 0.3 MG/DL
BILIRUBIN URINE: NEGATIVE
BLOOD URINE: NEGATIVE
BUN SERPL-MCNC: 17 MG/DL
CALCIUM SERPL-MCNC: 10 MG/DL
CHLORIDE SERPL-SCNC: 101 MMOL/L
CHOLEST SERPL-MCNC: 173 MG/DL
CO2 SERPL-SCNC: 27 MMOL/L
COLOR: YELLOW
CREAT SERPL-MCNC: 1.14 MG/DL
EOSINOPHIL # BLD AUTO: 0.18 K/UL
EOSINOPHIL NFR BLD AUTO: 2 %
ESTIMATED AVERAGE GLUCOSE: 131 MG/DL
GLUCOSE QUALITATIVE U: NEGATIVE
GLUCOSE SERPL-MCNC: 64 MG/DL
HBA1C MFR BLD HPLC: 6.2 %
HCT VFR BLD CALC: 45.7 %
HDLC SERPL-MCNC: 59 MG/DL
HGB BLD-MCNC: 14.1 G/DL
HYALINE CASTS: 0 /LPF
IMM GRANULOCYTES NFR BLD AUTO: 0.2 %
KETONES URINE: NEGATIVE
LDLC SERPL CALC-MCNC: 94 MG/DL
LEUKOCYTE ESTERASE URINE: NEGATIVE
LYMPHOCYTES # BLD AUTO: 2.61 K/UL
LYMPHOCYTES NFR BLD AUTO: 28.8 %
MAN DIFF?: NORMAL
MCHC RBC-ENTMCNC: 27.9 PG
MCHC RBC-ENTMCNC: 30.9 GM/DL
MCV RBC AUTO: 90.3 FL
MICROSCOPIC-UA: NORMAL
MONOCYTES # BLD AUTO: 0.64 K/UL
MONOCYTES NFR BLD AUTO: 7.1 %
NEUTROPHILS # BLD AUTO: 5.59 K/UL
NEUTROPHILS NFR BLD AUTO: 61.7 %
NITRITE URINE: NEGATIVE
NONHDLC SERPL-MCNC: 114 MG/DL
PH URINE: 6.5
PLATELET # BLD AUTO: 215 K/UL
POTASSIUM SERPL-SCNC: 4.2 MMOL/L
PROT SERPL-MCNC: 7.5 G/DL
PROTEIN URINE: NEGATIVE
RBC # BLD: 5.06 M/UL
RBC # FLD: 14.4 %
RED BLOOD CELLS URINE: 2 /HPF
SODIUM SERPL-SCNC: 137 MMOL/L
SPECIFIC GRAVITY URINE: 1.02
SQUAMOUS EPITHELIAL CELLS: 0 /HPF
T4 FREE SERPL-MCNC: 1 NG/DL
TRIGL SERPL-MCNC: 100 MG/DL
TSH SERPL-ACNC: 2.69 UIU/ML
UROBILINOGEN URINE: NORMAL
WBC # FLD AUTO: 9.06 K/UL
WHITE BLOOD CELLS URINE: 0 /HPF

## 2021-09-20 ENCOUNTER — APPOINTMENT (OUTPATIENT)
Dept: UROLOGY | Facility: CLINIC | Age: 56
End: 2021-09-20
Payer: COMMERCIAL

## 2021-09-20 VITALS
TEMPERATURE: 98.6 F | DIASTOLIC BLOOD PRESSURE: 82 MMHG | BODY MASS INDEX: 26.92 KG/M2 | WEIGHT: 188 LBS | HEIGHT: 70 IN | RESPIRATION RATE: 15 BRPM | HEART RATE: 69 BPM | OXYGEN SATURATION: 96 % | SYSTOLIC BLOOD PRESSURE: 137 MMHG

## 2021-09-20 PROCEDURE — 99213 OFFICE O/P EST LOW 20 MIN: CPT

## 2021-09-20 NOTE — PHYSICAL EXAM
[General Appearance - Well Developed] : well developed [General Appearance - Well Nourished] : well nourished [Normal Appearance] : normal appearance [Well Groomed] : well groomed [General Appearance - In No Acute Distress] : no acute distress [Abdomen Soft] : soft [Abdomen Tenderness] : non-tender [Costovertebral Angle Tenderness] : no ~M costovertebral angle tenderness [Urethral Meatus] : meatus normal [Penis Abnormality] : normal circumcised penis [Urinary Bladder Findings] : the bladder was normal on palpation [Scrotum] : the scrotum was normal [Testes Tenderness] : no tenderness of the testes [Testes Mass (___cm)] : there were no testicular masses [No Prostate Nodules] : no prostate nodules [Prostate Tenderness] : the prostate was not tender [Prostate Enlarged] : was enlarged [FreeTextEntry1] : JALIL done in 2020.  [Respiration, Rhythm And Depth] : normal respiratory rhythm and effort [] : no respiratory distress [Exaggerated Use Of Accessory Muscles For Inspiration] : no accessory muscle use

## 2021-09-20 NOTE — HISTORY OF PRESENT ILLNESS
[FreeTextEntry1] : He is a 56-year-old man who is seen today for elevated PSA level and prostate cancer.  In June 2021, he underwent CyberKnife radiation therapy for prostate cancer.  Urinary symptoms and erections are back to normal.  Residual urine today was 60 cc.  In March 2021, PSA level increased to 9.75.  He is repeating PSA level today.  Nocturia is once.\par \par Previous notes: Biopsy of the prostate in November 2020 showed less than 5% of 1 core Santos 6 prostate cancer.  In October 2020, PSA level was 8.8 with 22% free PSA.  He has undergone semen analysis after vasectomy which showed no sperm. \par \par He underwent vasectomy in July 2020. He underwent MRI of the prostate for elevated PSA level in June 2020 which showed prostate volume 85 cc with a 3 mm right peripheral zone region with suspicion score of 2. Testosterone was 427.  There is no family history of prostate cancer or other urological issues. There is no weight loss or bone pain.

## 2021-09-20 NOTE — ASSESSMENT
[FreeTextEntry1] : PSA level will be repeated today.  His urologic symptoms are back to his baseline.  Residual urine volume is minimal.  He will follow up in 6 months for repeat PSA level.  Expected side effects after radiation therapy were discussed.\par \par Mundo Shah MD, FACS\par [facility]\par  of Urology\par \par [address], [address]\[place] [address]\par \par [address], [address]\[place] [address]\par \par Tel: [phone]\par Fax: [phone]

## 2021-09-21 LAB — PSA SERPL-MCNC: 12 NG/ML

## 2022-03-24 ENCOUNTER — APPOINTMENT (OUTPATIENT)
Dept: UROLOGY | Facility: CLINIC | Age: 57
End: 2022-03-24

## 2022-03-30 ENCOUNTER — APPOINTMENT (OUTPATIENT)
Dept: UROLOGY | Facility: CLINIC | Age: 57
End: 2022-03-30
Payer: COMMERCIAL

## 2022-03-30 VITALS
BODY MASS INDEX: 26.48 KG/M2 | HEIGHT: 70 IN | TEMPERATURE: 98.2 F | WEIGHT: 185 LBS | SYSTOLIC BLOOD PRESSURE: 140 MMHG | DIASTOLIC BLOOD PRESSURE: 84 MMHG

## 2022-03-30 PROCEDURE — 99213 OFFICE O/P EST LOW 20 MIN: CPT

## 2022-03-30 NOTE — HISTORY OF PRESENT ILLNESS
[FreeTextEntry1] : He is a 56-year-old man who is seen today for elevated PSA level and prostate cancer.  He does not have significant urinary symptoms.  He does not complain of erectile dysfunction. In June 2021, he underwent CyberKnife radiation therapy for prostate cancer.  In September 2021, PSA level increased to 12.  He underwent treatment because PSA level was increasing on observation.  Residual urine volume was less than 50 cc.\par \par Previous notes: Biopsy of the prostate in November 2020 showed less than 5% of 1 core [place] 6 prostate cancer.  In October 2020, PSA level was 8.8 with 22% free PSA.  He has undergone semen analysis after vasectomy which showed no sperm. \par \par He underwent vasectomy in July 2020. He underwent MRI of the prostate for elevated PSA level in June 2020 which showed prostate volume 85 cc with a 3 mm right peripheral zone region with suspicion score of 2. Testosterone was 427.  There is no family history of prostate cancer or other urological issues. There is no weight loss or bone pain.

## 2022-03-30 NOTE — ASSESSMENT
[FreeTextEntry1] : He does not have any significant urologic symptoms.  Hopefully PSA level will start to decrease.  PSA will be repeated today and then we will discuss the next step on the phone.  He will follow-up in 6 months.\par \par Mundo Shah MD, FACS\par [facility]\par  of Urology\par \par [address], [address]\[place] [address]\par \par [address], [address]\[place] [address]\par \par Tel: [phone]\par Fax: [phone]

## 2022-03-30 NOTE — PHYSICAL EXAM
[Urethral Meatus] : meatus normal [Penis Abnormality] : normal circumcised penis [Urinary Bladder Findings] : the bladder was normal on palpation [Scrotum] : the scrotum was normal [Testes Tenderness] : no tenderness of the testes [Testes Mass (___cm)] : there were no testicular masses [Prostate Tenderness] : the prostate was not tender [No Prostate Nodules] : no prostate nodules [Prostate Enlarged] : was enlarged [General Appearance - Well Developed] : well developed [General Appearance - Well Nourished] : well nourished [Normal Appearance] : normal appearance [Well Groomed] : well groomed [General Appearance - In No Acute Distress] : no acute distress [Abdomen Soft] : soft [Abdomen Tenderness] : non-tender [Costovertebral Angle Tenderness] : no ~M costovertebral angle tenderness [FreeTextEntry1] : JALIL done previously. [] : no respiratory distress [Respiration, Rhythm And Depth] : normal respiratory rhythm and effort [Exaggerated Use Of Accessory Muscles For Inspiration] : no accessory muscle use [Oriented To Time, Place, And Person] : oriented to person, place, and time [Affect] : the affect was normal [Mood] : the mood was normal [Not Anxious] : not anxious

## 2022-03-31 LAB — PSA SERPL-MCNC: 12.4 NG/ML

## 2022-05-25 ENCOUNTER — OUTPATIENT (OUTPATIENT)
Dept: OUTPATIENT SERVICES | Facility: HOSPITAL | Age: 57
LOS: 1 days | End: 2022-05-25
Payer: COMMERCIAL

## 2022-05-25 ENCOUNTER — APPOINTMENT (OUTPATIENT)
Dept: NUCLEAR MEDICINE | Facility: IMAGING CENTER | Age: 57
End: 2022-05-25
Payer: COMMERCIAL

## 2022-05-25 DIAGNOSIS — C61 MALIGNANT NEOPLASM OF PROSTATE: ICD-10-CM

## 2022-05-25 PROCEDURE — 78816 PET IMAGE W/CT FULL BODY: CPT | Mod: 26

## 2022-05-25 PROCEDURE — 78816 PET IMAGE W/CT FULL BODY: CPT

## 2022-05-25 PROCEDURE — A9595: CPT

## 2022-09-23 ENCOUNTER — APPOINTMENT (OUTPATIENT)
Dept: ORTHOPEDIC SURGERY | Facility: CLINIC | Age: 57
End: 2022-09-23

## 2022-09-23 VITALS — HEIGHT: 70 IN | BODY MASS INDEX: 26.48 KG/M2 | WEIGHT: 185 LBS

## 2022-09-23 DIAGNOSIS — M75.121 COMPLETE ROTATOR CUFF TEAR OR RUPTURE OF RIGHT SHOULDER, NOT SPECIFIED AS TRAUMATIC: ICD-10-CM

## 2022-09-23 PROCEDURE — 99072 ADDL SUPL MATRL&STAF TM PHE: CPT

## 2022-09-23 PROCEDURE — 99213 OFFICE O/P EST LOW 20 MIN: CPT

## 2022-09-23 NOTE — DISCUSSION/SUMMARY
[de-identified] : General Dx Discussion\par The patient was advised of the diagnosis. The natural history of the pathology was explained in full to the patient in layman's terms. All questions were answered. The risks and benefits of surgical and non-surgical treatment alternatives were explained in full to the patient.\par \par

## 2022-09-23 NOTE — HISTORY OF PRESENT ILLNESS
[7] : 7 [6] : 6 [Full time] : Work status: full time [] : yes [FreeTextEntry1] : right shoulder  [FreeTextEntry5] :  ALLYSSA MCHUGH is a 57 year male who is here today for follow up on his right shoulder pain. He states the pain has improved but he has occasional flare ups. [de-identified] : none

## 2022-09-23 NOTE — PHYSICAL EXAM
[NL (0-180)] : full active abduction 0-180 degrees [5 ___] : forward flexion 5[unfilled]/5 [5___] : internal rotation 5[unfilled]/5 [] : negative drop-arm test [TWNoteComboBox7] : active forward flexion 170 degrees [de-identified] : active abduction 170 degrees [TWNoteComboBox6] : internal rotation 70 degrees [de-identified] : external rotation 80 degrees

## 2022-09-23 NOTE — WORK
[Partial] : partial [N/A] : : Not Applicable [Patient] : patient [No Rx restrictions] : No Rx restrictions. [I provided the services listed above] :  I provided the services listed above. [FreeTextEntry2] : working full duty

## 2022-11-04 ENCOUNTER — APPOINTMENT (OUTPATIENT)
Dept: UROLOGY | Facility: CLINIC | Age: 57
End: 2022-11-04

## 2022-11-04 VITALS
SYSTOLIC BLOOD PRESSURE: 128 MMHG | RESPIRATION RATE: 16 BRPM | HEIGHT: 70 IN | BODY MASS INDEX: 26.48 KG/M2 | TEMPERATURE: 97.3 F | HEART RATE: 74 BPM | DIASTOLIC BLOOD PRESSURE: 74 MMHG | WEIGHT: 185 LBS

## 2022-11-04 DIAGNOSIS — R97.20 ELEVATED PROSTATE, SPECIFIC ANTIGEN [PSA]: ICD-10-CM

## 2022-11-04 DIAGNOSIS — C61 MALIGNANT NEOPLASM OF PROSTATE: ICD-10-CM

## 2022-11-04 PROCEDURE — 99213 OFFICE O/P EST LOW 20 MIN: CPT

## 2022-11-04 NOTE — PHYSICAL EXAM
[Urethral Meatus] : meatus normal [Penis Abnormality] : normal circumcised penis [Urinary Bladder Findings] : the bladder was normal on palpation [Scrotum] : the scrotum was normal [Testes Tenderness] : no tenderness of the testes [Testes Mass (___cm)] : there were no testicular masses [Prostate Tenderness] : the prostate was not tender [No Prostate Nodules] : no prostate nodules [Prostate Enlarged] : was enlarged [General Appearance - Well Developed] : well developed [General Appearance - Well Nourished] : well nourished [Normal Appearance] : normal appearance [Well Groomed] : well groomed [General Appearance - In No Acute Distress] : no acute distress [Abdomen Soft] : soft [Abdomen Tenderness] : non-tender [Costovertebral Angle Tenderness] : no ~M costovertebral angle tenderness [FreeTextEntry1] : JALIL done before.  [] : no respiratory distress [Respiration, Rhythm And Depth] : normal respiratory rhythm and effort [Exaggerated Use Of Accessory Muscles For Inspiration] : no accessory muscle use

## 2022-11-04 NOTE — ASSESSMENT
[FreeTextEntry1] : We discussed his recent imaging studies and also PSA levels.  Patient states that PSA level has decreased and therefore it would be repeated today.  He will continue to monitor closely with us and also his radiation oncologist.  He does not have otherwise any significant neurological symptoms.\par \par Mundo Shah MD, FACS\par [facility]\par  of Urology\par \par [address], [address]\[place] [address]\par \par [address], [address]\[place] [address]\par \par Tel: [phone]\par Fax: [phone]

## 2022-11-04 NOTE — HISTORY OF PRESENT ILLNESS
[FreeTextEntry1] : He is a 56-year-old man who is seen today for elevated PSA level and prostate cancer.  He does not have bothersome urinary symptoms and does not complain of erectile dysfunction.  In March 2022, PSA level was 12.4.  Therefore he underwent a PSMA PET scan which showed mainly activity in the left seminal vesicle.  Patient states that since then had another PSA which was lower, around 6.  He does not complain of weight loss or bone pain.\par \par He underwent treatment because PSA level was increasing on observation.  Residual urine volume was minimal.\par \par Previous notes: Biopsy of the prostate in November 2020 showed less than 5% of 1 core [place] 6 prostate cancer.  In October 2020, PSA level was 8.8 with 22% free PSA.  He has undergone semen analysis after vasectomy which showed no sperm. \par \par He underwent vasectomy in July 2020. He underwent MRI of the prostate for elevated PSA level in June 2020 which showed prostate volume 85 cc with a 3 mm right peripheral zone region with suspicion score of 2. Testosterone was 427.  There is no family history of prostate cancer or other urological issues. There is no weight loss or bone pain.

## 2022-11-07 LAB — PSA SERPL-MCNC: 12.5 NG/ML

## 2023-01-30 ENCOUNTER — APPOINTMENT (OUTPATIENT)
Dept: ORTHOPEDIC SURGERY | Facility: CLINIC | Age: 58
End: 2023-01-30
Payer: OTHER MISCELLANEOUS

## 2023-01-30 VITALS — HEIGHT: 71 IN | BODY MASS INDEX: 25.9 KG/M2 | WEIGHT: 185 LBS

## 2023-01-30 DIAGNOSIS — S42.125S: ICD-10-CM

## 2023-01-30 DIAGNOSIS — M50.90 CERVICAL DISC DISORDER, UNSPECIFIED, UNSPECIFIED CERVICAL REGION: ICD-10-CM

## 2023-01-30 DIAGNOSIS — S43.52XA SPRAIN OF LEFT ACROMIOCLAVICULAR JOINT, INITIAL ENCOUNTER: ICD-10-CM

## 2023-01-30 DIAGNOSIS — S46.012A STRAIN OF MUSCLE(S) AND TENDON(S) OF THE ROTATOR CUFF OF LEFT SHOULDER, INITIAL ENCOUNTER: ICD-10-CM

## 2023-01-30 PROCEDURE — 73030 X-RAY EXAM OF SHOULDER: CPT | Mod: LT

## 2023-01-30 PROCEDURE — 99072 ADDL SUPL MATRL&STAF TM PHE: CPT

## 2023-01-30 PROCEDURE — 99214 OFFICE O/P EST MOD 30 MIN: CPT

## 2023-01-30 PROCEDURE — 72040 X-RAY EXAM NECK SPINE 2-3 VW: CPT

## 2023-01-30 NOTE — WORK
[Sprain/Strain] : sprain/strain [Was the competent medical cause of the injury] : was the competent medical cause of the injury [Does not reveal pre-existing condition(s) that may affect treatment/prognosis] : does not reveal pre-existing condition(s) that may affect treatment/prognosis [Can return to work without limitations on ______] : can return to work without limitations on [unfilled] [No Rx restrictions] : No Rx restrictions. [I provided the services listed above] :  I provided the services listed above.

## 2023-01-30 NOTE — DISCUSSION/SUMMARY
[de-identified] : General Discussion\par The patient was advised of the diagnosis.  The natural history of the pathology was explained in full to the patient in layman's terms. All questions were answered.  The risks and benefits of surgical and non-surgical treatment alternatives were explained in full to the patient\par \par MRI L shoulder r/o RTC tear\par He is working full duty\par f/u after MRI for review \par \par Instructions: Entered by Andreina CROWELL acting as scribe.\par \par Dr. Mckeon -\par The documentation recorded by the scribe accurately reflects the service I personally performed and the decisions made by me.\par

## 2023-01-30 NOTE — HISTORY OF PRESENT ILLNESS
[Work related] : work related [8] : 8 [0] : 0 [Burning] : burning [Radiating] : radiating [Sharp] : sharp [Throbbing] : throbbing [Intermittent] : intermittent [Leisure] : leisure [Work] : work [Sleep] : sleep [Lying in bed] : lying in bed [Full time] : Work status: full time [de-identified] : WC DOI  8/4/17\par \par 58 yo RHD m  () with left shoulder pain after it got jammed in a door frame. He reports the shoulder popped out, and he was able to self reduce the shoulder. He was seen at an urgent care at the time where xrays were done. [facility]  He had a few sessions of PT at the time. Currently has burning and tingling into the left shoulder when he sleeps. Pain with [place] activities. He has been taking Motrin. He is working,  [] : no [FreeTextEntry1] : left shoulder [FreeTextEntry3] : 8/4/2017 [FreeTextEntry5] : ALLYSSA is a 57 year old M who is here today for WC left shoulder injury. Patient was injured when he was jammed up in a door frame at his job. Patient's shouler popped out but he was able to pop it back in himself.  [place] Patient reports feeling intermittent numbness in arm. [FreeTextEntry7] : radiates to his neck [FreeTextEntry9] : s [de-identified] : xray

## 2023-01-30 NOTE — IMAGING
[Disc space narrowing] : Disc space narrowing [Left] : left shoulder [FreeTextEntry1] : Healed Acromial fracture with subacromial calcification and spurs.

## 2023-01-30 NOTE — PHYSICAL EXAM
[Left] : left shoulder [Standing] : standing [Mild] : mild [5 ___] : forward flexion 5[unfilled]/5 [5___] : external rotation 5[unfilled]/5 [] : light touch intact throughout both upper extremities [TWNoteComboBox7] : active forward flexion 150 degrees [de-identified] : active abduction 120 degrees [TWNoteComboBox6] : internal rotation L3 [de-identified] : external rotation 65 degrees

## 2023-02-03 ENCOUNTER — FORM ENCOUNTER (OUTPATIENT)
Age: 58
End: 2023-02-03

## 2023-02-04 ENCOUNTER — APPOINTMENT (OUTPATIENT)
Dept: MRI IMAGING | Facility: CLINIC | Age: 58
End: 2023-02-04
Payer: OTHER MISCELLANEOUS

## 2023-02-04 PROCEDURE — 99072 ADDL SUPL MATRL&STAF TM PHE: CPT

## 2023-02-04 PROCEDURE — 73221 MRI JOINT UPR EXTREM W/O DYE: CPT | Mod: LT

## 2023-02-05 ENCOUNTER — APPOINTMENT (OUTPATIENT)
Dept: MRI IMAGING | Facility: CLINIC | Age: 58
End: 2023-02-05

## 2023-02-17 ENCOUNTER — APPOINTMENT (OUTPATIENT)
Dept: ORTHOPEDIC SURGERY | Facility: CLINIC | Age: 58
End: 2023-02-17
Payer: OTHER MISCELLANEOUS

## 2023-02-17 VITALS — WEIGHT: 185 LBS | BODY MASS INDEX: 25.9 KG/M2 | HEIGHT: 71 IN

## 2023-02-17 PROCEDURE — 99214 OFFICE O/P EST MOD 30 MIN: CPT

## 2023-02-17 PROCEDURE — 99072 ADDL SUPL MATRL&STAF TM PHE: CPT

## 2023-02-17 NOTE — DISCUSSION/SUMMARY
[de-identified] : General Discussion\par The patient was advised of the diagnosis.  The natural history of the pathology was explained in full to the patient in layman's terms. All questions were answered.  The risks and benefits of surgical and non-surgical treatment alternatives were explained in full to the patient\par \par will cont HEP \par poss of future surgery discussed \par questions answered

## 2023-02-17 NOTE — HISTORY OF PRESENT ILLNESS
[8] : 8 [0] : 0 [Burning] : burning [Radiating] : radiating [Stabbing] : stabbing [Intermittent] : intermittent [Lying in bed] : lying in bed [] : Post Surgical Visit: no [FreeTextEntry5] : Patient is here today to go over MRI results left shoulder. Pain is the same since last visit [FreeTextEntry7] : radiates to his neck

## 2023-02-17 NOTE — PHYSICAL EXAM
[Left] : left shoulder [Standing] : standing [Mild] : mild [5 ___] : forward flexion 5[unfilled]/5 [5___] : external rotation 5[unfilled]/5 [] : motor and sensory intact distally [TWNoteComboBox7] : active forward flexion 150 degrees [de-identified] : active abduction 125 degrees [TWNoteComboBox6] : internal rotation L3 [de-identified] : external rotation 70 degrees

## 2023-02-17 NOTE — WORK
[Does not reveal pre-existing condition(s) that may affect treatment/prognosis] : does not reveal pre-existing condition(s) that may affect treatment/prognosis [Can return to work without limitations on ______] : can return to work without limitations on [unfilled] [No Rx restrictions] : No Rx restrictions. [I provided the services listed above] :  I provided the services listed above.

## 2023-03-24 ENCOUNTER — NON-APPOINTMENT (OUTPATIENT)
Age: 58
End: 2023-03-24

## 2023-03-30 ENCOUNTER — NON-APPOINTMENT (OUTPATIENT)
Age: 58
End: 2023-03-30

## 2023-05-15 ENCOUNTER — APPOINTMENT (OUTPATIENT)
Dept: UROLOGY | Facility: CLINIC | Age: 58
End: 2023-05-15

## 2023-05-22 ENCOUNTER — APPOINTMENT (OUTPATIENT)
Dept: ORTHOPEDIC SURGERY | Facility: CLINIC | Age: 58
End: 2023-05-22
Payer: OTHER MISCELLANEOUS

## 2023-05-22 VITALS — BODY MASS INDEX: 26.48 KG/M2 | WEIGHT: 185 LBS | HEIGHT: 70 IN

## 2023-05-22 PROCEDURE — 99214 OFFICE O/P EST MOD 30 MIN: CPT

## 2023-05-22 NOTE — DISCUSSION/SUMMARY
[de-identified] : General Discussion\par The patient was advised of the diagnosis.  The natural history of the pathology was explained in full to the patient in layman's terms. All questions were answered.  The risks and benefits of surgical and non-surgical treatment alternatives were explained in full to the patient\par \par will cont HEP \par f/u 3 months poss of future surgery discussed

## 2023-05-22 NOTE — PHYSICAL EXAM
[Left] : left shoulder [Standing] : standing [Mild] : mild [5 ___] : forward flexion 5[unfilled]/5 [5___] : external rotation 5[unfilled]/5 [] : motor and sensory intact distally [TWNoteComboBox7] : active forward flexion 150 degrees [de-identified] : active abduction 120 degrees [TWNoteComboBox6] : internal rotation L3 [de-identified] : external rotation 70 degrees

## 2023-05-22 NOTE — HISTORY OF PRESENT ILLNESS
[Work related] : work related [7] : 7 [Burning] : burning [Constant] : constant [Sleep] : sleep [Full time] : Work status: full time [de-identified] : Patient is here for a WC follow  up on his left shoulder. [] : no [FreeTextEntry1] : Left shoulder [FreeTextEntry3] : 8/4/16 [de-identified] : HEP, stim

## 2023-08-09 ENCOUNTER — OFFICE (OUTPATIENT)
Dept: URBAN - METROPOLITAN AREA CLINIC 27 | Facility: CLINIC | Age: 58
Setting detail: OPHTHALMOLOGY
End: 2023-08-09
Payer: COMMERCIAL

## 2023-08-09 ENCOUNTER — RX ONLY (RX ONLY)
Age: 58
End: 2023-08-09

## 2023-08-09 DIAGNOSIS — H16.222: ICD-10-CM

## 2023-08-09 DIAGNOSIS — G51.31: ICD-10-CM

## 2023-08-09 DIAGNOSIS — H04.213: ICD-10-CM

## 2023-08-09 DIAGNOSIS — H16.221: ICD-10-CM

## 2023-08-09 PROCEDURE — 83861 MICROFLUID ANALY TEARS: CPT | Performed by: OPHTHALMOLOGY

## 2023-08-09 PROCEDURE — 92012 INTRM OPH EXAM EST PATIENT: CPT | Performed by: OPHTHALMOLOGY

## 2023-08-09 PROCEDURE — 68840 EXPLORE/IRRIGATE TEAR DUCTS: CPT | Performed by: OPHTHALMOLOGY

## 2023-08-09 ASSESSMENT — INCREASING TEAR LAKE - SEVERITY SCORE: OD_INC_TEARLAKE: 1+

## 2023-08-09 ASSESSMENT — REFRACTION_AUTOREFRACTION
OD_SPHERE: -0.50
OS_SPHERE: -0.25
OS_CYLINDER: +0.25
OS_AXIS: 117
OD_AXIS: 134
OD_CYLINDER: +0.25

## 2023-08-09 ASSESSMENT — KERATOMETRY
OD_K2POWER_DIOPTERS: 45.75
METHOD_AUTO_MANUAL: AUTO
OS_AXISANGLE_DEGREES: 87
OS_K2POWER_DIOPTERS: 46.25
OD_K1POWER_DIOPTERS: 45.25
OD_AXISANGLE_DEGREES: 99
OS_K1POWER_DIOPTERS: 45.25

## 2023-08-09 ASSESSMENT — SPHEQUIV_DERIVED
OS_SPHEQUIV: -0.125
OD_SPHEQUIV: -0.375

## 2023-08-09 ASSESSMENT — VISUAL ACUITY
OD_BCVA: 20/20-1
OS_BCVA: 20/20

## 2023-08-09 ASSESSMENT — AXIALLENGTH_DERIVED
OS_AL: 22.839
OD_AL: 23.0177

## 2023-08-09 ASSESSMENT — CONFRONTATIONAL VISUAL FIELD TEST (CVF)
OD_FINDINGS: FULL
OS_FINDINGS: FULL

## 2023-08-24 ENCOUNTER — APPOINTMENT (OUTPATIENT)
Dept: ORTHOPEDIC SURGERY | Facility: CLINIC | Age: 58
End: 2023-08-24
Payer: OTHER MISCELLANEOUS

## 2023-08-24 VITALS — HEIGHT: 70 IN | WEIGHT: 185 LBS | BODY MASS INDEX: 26.48 KG/M2

## 2023-08-24 PROCEDURE — 99214 OFFICE O/P EST MOD 30 MIN: CPT

## 2023-08-24 NOTE — DISCUSSION/SUMMARY
[de-identified] : General Discussion The patient was advised of the diagnosis.  The natural history of the pathology was explained in full to the patient in layman's terms. All questions were answered.  The risks and benefits of surgical and non-surgical treatment alternatives were explained in full to the patient  Continue HEP  f/u 3 months  does not want to consider surgery at this time

## 2023-08-24 NOTE — HISTORY OF PRESENT ILLNESS
[Work related] : work related [7] : 7 [9] : 9 [] : yes [Intermittent] : intermittent [Household chores] : household chores [Leisure] : leisure [Work] : work [Sleep] : sleep [Full time] : Work status: full time [de-identified] : Patient is here for a WC follow up on his left shoulder. Been doing HEP. Patient reports intermittent pain.  [FreeTextEntry1] : Left shoulder [FreeTextEntry3] : 8/14/16 [FreeTextEntry6] : pulsing, jabbing [FreeTextEntry7] : to his fingers [FreeTextEntry9] : stim [de-identified] : works out

## 2023-08-24 NOTE — PHYSICAL EXAM
[Left] : left shoulder [Standing] : standing [Minimal] : minimal [5 ___] : forward flexion 5[unfilled]/5 [5___] : internal rotation 5[unfilled]/5 [] : negative drop-arm test [TWNoteComboBox7] : active forward flexion 155 degrees [de-identified] : active abduction 120 degrees [TWNoteComboBox6] : internal rotation L3 [de-identified] : external rotation 70 degrees

## 2023-09-13 ENCOUNTER — OFFICE (OUTPATIENT)
Dept: URBAN - METROPOLITAN AREA CLINIC 27 | Facility: CLINIC | Age: 58
Setting detail: OPHTHALMOLOGY
End: 2023-09-13
Payer: COMMERCIAL

## 2023-09-13 DIAGNOSIS — H04.213: ICD-10-CM

## 2023-09-13 DIAGNOSIS — H02.532: ICD-10-CM

## 2023-09-13 DIAGNOSIS — H16.223: ICD-10-CM

## 2023-09-13 DIAGNOSIS — G51.31: ICD-10-CM

## 2023-09-13 PROCEDURE — 99214 OFFICE O/P EST MOD 30 MIN: CPT | Performed by: OPHTHALMOLOGY

## 2023-09-13 PROCEDURE — 92285 EXTERNAL OCULAR PHOTOGRAPHY: CPT | Performed by: OPHTHALMOLOGY

## 2023-09-13 ASSESSMENT — AXIALLENGTH_DERIVED
OS_AL: 22.839
OD_AL: 23.0177

## 2023-09-13 ASSESSMENT — REFRACTION_AUTOREFRACTION
OD_AXIS: 134
OS_AXIS: 117
OS_SPHERE: -0.25
OS_CYLINDER: +0.25
OD_SPHERE: -0.50
OD_CYLINDER: +0.25

## 2023-09-13 ASSESSMENT — KERATOMETRY
OS_AXISANGLE_DEGREES: 87
OS_K1POWER_DIOPTERS: 45.25
METHOD_AUTO_MANUAL: AUTO
OD_AXISANGLE_DEGREES: 99
OD_K2POWER_DIOPTERS: 45.75
OS_K2POWER_DIOPTERS: 46.25
OD_K1POWER_DIOPTERS: 45.25

## 2023-09-13 ASSESSMENT — CONFRONTATIONAL VISUAL FIELD TEST (CVF)
OS_FINDINGS: FULL
OD_FINDINGS: FULL

## 2023-09-13 ASSESSMENT — VISUAL ACUITY
OD_BCVA: 20/20
OS_BCVA: 20/25-2

## 2023-09-13 ASSESSMENT — SPHEQUIV_DERIVED
OD_SPHEQUIV: -0.375
OS_SPHEQUIV: -0.125

## 2023-09-13 ASSESSMENT — INCREASING TEAR LAKE - SEVERITY SCORE: OD_INC_TEARLAKE: 1+

## 2023-11-17 ENCOUNTER — APPOINTMENT (OUTPATIENT)
Dept: ORTHOPEDIC SURGERY | Facility: CLINIC | Age: 58
End: 2023-11-17
Payer: OTHER MISCELLANEOUS

## 2023-11-17 VITALS — HEIGHT: 70 IN | WEIGHT: 185 LBS | BODY MASS INDEX: 26.48 KG/M2

## 2023-11-17 PROCEDURE — 99213 OFFICE O/P EST LOW 20 MIN: CPT

## 2024-02-16 ENCOUNTER — APPOINTMENT (OUTPATIENT)
Dept: ORTHOPEDIC SURGERY | Facility: CLINIC | Age: 59
End: 2024-02-16

## 2024-02-22 ENCOUNTER — APPOINTMENT (OUTPATIENT)
Dept: ORTHOPEDIC SURGERY | Facility: CLINIC | Age: 59
End: 2024-02-22
Payer: OTHER MISCELLANEOUS

## 2024-02-22 VITALS — BODY MASS INDEX: 36.32 KG/M2 | HEIGHT: 60 IN | WEIGHT: 185 LBS

## 2024-02-22 PROCEDURE — 99213 OFFICE O/P EST LOW 20 MIN: CPT

## 2024-02-22 NOTE — HISTORY OF PRESENT ILLNESS
[Work related] : work related [7] : 7 [9] : 9 [Throbbing] : throbbing [Intermittent] : intermittent [Household chores] : household chores [Leisure] : leisure [Sleep] : sleep [Lying in bed] : lying in bed [Full time] : Work status: full time [de-identified] : WC DOI 8/04/2017: Patient is here for a follow up on his left shoulder. Doing a HEP. He reports continued pain particularly at night.  [] : no [FreeTextEntry1] : Left shoulder [FreeTextEntry3] : 08/04/2017 [de-identified] : Home exercise

## 2024-02-22 NOTE — PHYSICAL EXAM
[Left] : left shoulder [Standing] : standing [Minimal] : minimal [5 ___] : forward flexion 5[unfilled]/5 [5___] : internal rotation 5[unfilled]/5 [] : negative drop-arm test [TWNoteComboBox7] : active forward flexion 165 degrees [de-identified] : active abduction 150 degrees [de-identified] : external rotation 70 degrees [TWNoteComboBox6] : internal rotation L3

## 2024-02-22 NOTE — DISCUSSION/SUMMARY
[de-identified] : General Discussion The patient was advised of the diagnosis.  The natural history of the pathology was explained in full to the patient in layman's terms. All questions were answered.  The risks and benefits of surgical and non-surgical treatment alternatives were explained in full to the patient  Case discussed. Continue HEP. Follow up in 3 months.  Entered by MERVAT Garcia acting as scribe. - The documentation recorded by the scribe accurately reflects the service I personally performed and the decisions made by me.

## 2024-05-24 ENCOUNTER — APPOINTMENT (OUTPATIENT)
Dept: ORTHOPEDIC SURGERY | Facility: CLINIC | Age: 59
End: 2024-05-24
Payer: OTHER MISCELLANEOUS

## 2024-05-24 DIAGNOSIS — M75.42 IMPINGEMENT SYNDROME OF LEFT SHOULDER: ICD-10-CM

## 2024-05-24 DIAGNOSIS — M75.52 BURSITIS OF LEFT SHOULDER: ICD-10-CM

## 2024-05-24 DIAGNOSIS — M75.112 INCOMPLETE ROTATOR CUFF TEAR OR RUPTURE OF LEFT SHOULDER, NOT SPECIFIED AS TRAUMATIC: ICD-10-CM

## 2024-05-24 PROCEDURE — 99214 OFFICE O/P EST MOD 30 MIN: CPT

## 2024-05-24 RX ORDER — MELOXICAM 15 MG/1
15 TABLET ORAL
Qty: 30 | Refills: 0 | Status: COMPLETED | COMMUNITY
Start: 2024-05-24 | End: 2024-06-23

## 2024-05-24 NOTE — DISCUSSION/SUMMARY
[Medication Risks Reviewed] : Medication risks reviewed [de-identified] : General Discussion The patient was advised of the diagnosis.  The natural history of the pathology was explained in full to the patient in layman's terms. All questions were answered.  The risks and benefits of surgical and non-surgical treatment alternatives were explained in full to the patient  Case discussed. Continue HEP. Follow up in 3 months.  Patient was given a prescription for an anti-inflammatory medication.  They will  take it for the next 5-7 days and then on an as needed basis, as long as there are  no medical contra-indications.  Patient is counseled on possible GI and blood  pressure side effects.  Entered by MERVAT Garcia acting as scribe. - The documentation recorded by the scribe accurately reflects the service I personally performed and the decisions made by me.

## 2024-05-24 NOTE — HISTORY OF PRESENT ILLNESS
[Work related] : work related [7] : 7 [9] : 9 [Throbbing] : throbbing [Intermittent] : intermittent [Household chores] : household chores [Leisure] : leisure [Sleep] : sleep [Lying in bed] : lying in bed [Full time] : Work status: full time [de-identified] : WC DOI 8/04/2017: Patient is here for a follow up on his left shoulder. Doing a HEP. He reports that his condition remains about the same since the last visit. [] : no [FreeTextEntry1] : Left shoulder [FreeTextEntry3] : 08/04/2017 [de-identified] : Home exercise

## 2024-05-24 NOTE — PHYSICAL EXAM
[Left] : left shoulder [Standing] : standing [Minimal] : minimal [5 ___] : forward flexion 5[unfilled]/5 [5___] : internal rotation 5[unfilled]/5 [] : negative drop-arm test [TWNoteComboBox7] : active forward flexion 160 degrees [de-identified] : active abduction 150 degrees [TWNoteComboBox6] : internal rotation L3 [de-identified] : external rotation 70 degrees

## 2024-08-07 ENCOUNTER — APPOINTMENT (OUTPATIENT)
Dept: ORTHOPEDIC SURGERY | Facility: CLINIC | Age: 59
End: 2024-08-07

## 2024-08-07 PROCEDURE — 99213 OFFICE O/P EST LOW 20 MIN: CPT

## 2024-08-07 NOTE — DISCUSSION/SUMMARY
[Medication Risks Reviewed] : Medication risks reviewed [de-identified] : General Discussion The patient was advised of the diagnosis.  The natural history of the pathology was explained in full to the patient in layman's terms. All questions were answered.  The risks and benefits of surgical and non-surgical treatment alternatives were explained in full to the patient  Case discussed. Continue HEP. Mobic as needed. Patient was given a prescription for an anti-inflammatory medication.  They will take it for the next 5-7 days and then on an as needed basis, as long as there are no medical contra-indications.  Patient is counseled on possible GI and blood pressure side effects. Follow up in 3 months.  Entered by MERVAT Garcia acting as scribe. - The documentation recorded by the scribe accurately reflects the service I personally performed and the decisions made by me.

## 2024-08-07 NOTE — PHYSICAL EXAM
[Left] : left shoulder [Standing] : standing [Minimal] : minimal [5 ___] : forward flexion 5[unfilled]/5 [5___] : internal rotation 5[unfilled]/5 [] : negative drop-arm test [TWNoteComboBox7] : active forward flexion 160 degrees [de-identified] : active abduction 150 degrees [TWNoteComboBox6] : internal rotation L3 [de-identified] : external rotation 70 degrees

## 2024-08-07 NOTE — PHYSICAL EXAM
[Left] : left shoulder [Standing] : standing [Minimal] : minimal [5 ___] : forward flexion 5[unfilled]/5 [5___] : internal rotation 5[unfilled]/5 [] : negative drop-arm test [TWNoteComboBox7] : active forward flexion 160 degrees [de-identified] : active abduction 150 degrees [TWNoteComboBox6] : internal rotation L3 [de-identified] : external rotation 70 degrees

## 2024-08-07 NOTE — HISTORY OF PRESENT ILLNESS
[Work related] : work related [7] : 7 [9] : 9 [Throbbing] : throbbing [Intermittent] : intermittent [Household chores] : household chores [Leisure] : leisure [Sleep] : sleep [Lying in bed] : lying in bed [Full time] : Work status: full time [de-identified] : WC DOI 8/04/2017: Patient is here for a follow up on his left shoulder. Doing a HEP. He reports that his condition remains about the same since the last visit. [] : no [FreeTextEntry1] : Left shoulder [FreeTextEntry3] : 08/04/2017 [de-identified] : Home exercise

## 2024-08-07 NOTE — DISCUSSION/SUMMARY
[Medication Risks Reviewed] : Medication risks reviewed [de-identified] : General Discussion The patient was advised of the diagnosis.  The natural history of the pathology was explained in full to the patient in layman's terms. All questions were answered.  The risks and benefits of surgical and non-surgical treatment alternatives were explained in full to the patient  Case discussed. Continue HEP. Mobic as needed. Patient was given a prescription for an anti-inflammatory medication.  They will take it for the next 5-7 days and then on an as needed basis, as long as there are no medical contra-indications.  Patient is counseled on possible GI and blood pressure side effects. Follow up in 3 months.  Entered by MERVAT Garcia acting as scribe. - The documentation recorded by the scribe accurately reflects the service I personally performed and the decisions made by me.

## 2024-08-07 NOTE — HISTORY OF PRESENT ILLNESS
[Work related] : work related [7] : 7 [9] : 9 [Throbbing] : throbbing [Intermittent] : intermittent [Household chores] : household chores [Leisure] : leisure [Sleep] : sleep [Lying in bed] : lying in bed [Full time] : Work status: full time [de-identified] : WC DOI 8/04/2017: Patient is here for a follow up on his left shoulder. Doing a HEP. He reports that his condition remains about the same since the last visit. [] : no [FreeTextEntry1] : Left shoulder [FreeTextEntry3] : 08/04/2017 [de-identified] : Home exercise

## 2024-11-11 ENCOUNTER — APPOINTMENT (OUTPATIENT)
Dept: ORTHOPEDIC SURGERY | Facility: CLINIC | Age: 59
End: 2024-11-11
Payer: OTHER MISCELLANEOUS

## 2024-11-11 VITALS — HEIGHT: 60 IN | BODY MASS INDEX: 36.32 KG/M2 | WEIGHT: 185 LBS

## 2024-11-11 DIAGNOSIS — M75.52 BURSITIS OF LEFT SHOULDER: ICD-10-CM

## 2024-11-11 DIAGNOSIS — M75.42 IMPINGEMENT SYNDROME OF LEFT SHOULDER: ICD-10-CM

## 2024-11-11 DIAGNOSIS — M75.112 INCOMPLETE ROTATOR CUFF TEAR OR RUPTURE OF LEFT SHOULDER, NOT SPECIFIED AS TRAUMATIC: ICD-10-CM

## 2024-11-11 PROCEDURE — 99213 OFFICE O/P EST LOW 20 MIN: CPT

## 2025-02-07 ENCOUNTER — APPOINTMENT (OUTPATIENT)
Dept: ORTHOPEDIC SURGERY | Facility: CLINIC | Age: 60
End: 2025-02-07
Payer: OTHER MISCELLANEOUS

## 2025-02-07 VITALS — BODY MASS INDEX: 36.32 KG/M2 | HEIGHT: 60 IN | WEIGHT: 185 LBS

## 2025-02-07 DIAGNOSIS — M75.52 BURSITIS OF LEFT SHOULDER: ICD-10-CM

## 2025-02-07 DIAGNOSIS — M75.112 INCOMPLETE ROTATOR CUFF TEAR OR RUPTURE OF LEFT SHOULDER, NOT SPECIFIED AS TRAUMATIC: ICD-10-CM

## 2025-02-07 PROCEDURE — 99213 OFFICE O/P EST LOW 20 MIN: CPT

## 2025-05-02 ENCOUNTER — APPOINTMENT (OUTPATIENT)
Dept: ORTHOPEDIC SURGERY | Facility: CLINIC | Age: 60
End: 2025-05-02
Payer: OTHER MISCELLANEOUS

## 2025-05-02 DIAGNOSIS — M75.52 BURSITIS OF LEFT SHOULDER: ICD-10-CM

## 2025-05-02 DIAGNOSIS — M75.112 INCOMPLETE ROTATOR CUFF TEAR OR RUPTURE OF LEFT SHOULDER, NOT SPECIFIED AS TRAUMATIC: ICD-10-CM

## 2025-05-02 DIAGNOSIS — M75.42 IMPINGEMENT SYNDROME OF LEFT SHOULDER: ICD-10-CM

## 2025-05-02 PROCEDURE — 99213 OFFICE O/P EST LOW 20 MIN: CPT

## 2025-05-15 ENCOUNTER — APPOINTMENT (OUTPATIENT)
Dept: MRI IMAGING | Facility: CLINIC | Age: 60
End: 2025-05-15
Payer: OTHER MISCELLANEOUS

## 2025-05-15 PROCEDURE — 73221 MRI JOINT UPR EXTREM W/O DYE: CPT | Mod: LT

## 2025-05-23 ENCOUNTER — APPOINTMENT (OUTPATIENT)
Dept: ORTHOPEDIC SURGERY | Facility: CLINIC | Age: 60
End: 2025-05-23

## 2025-05-23 VITALS — HEIGHT: 60 IN | WEIGHT: 185 LBS | BODY MASS INDEX: 36.32 KG/M2

## 2025-05-23 DIAGNOSIS — M75.122 COMPLETE ROTATOR CUFF TEAR OR RUPTURE OF LEFT SHOULDER, NOT SPECIFIED AS TRAUMATIC: ICD-10-CM

## 2025-05-23 DIAGNOSIS — M75.42 IMPINGEMENT SYNDROME OF LEFT SHOULDER: ICD-10-CM

## 2025-05-23 PROCEDURE — 99213 OFFICE O/P EST LOW 20 MIN: CPT

## 2025-07-18 ENCOUNTER — APPOINTMENT (OUTPATIENT)
Dept: ORTHOPEDIC SURGERY | Facility: CLINIC | Age: 60
End: 2025-07-18
Payer: OTHER MISCELLANEOUS

## 2025-07-18 PROCEDURE — 99213 OFFICE O/P EST LOW 20 MIN: CPT
